# Patient Record
Sex: FEMALE | Race: WHITE | Employment: STUDENT | URBAN - METROPOLITAN AREA
[De-identification: names, ages, dates, MRNs, and addresses within clinical notes are randomized per-mention and may not be internally consistent; named-entity substitution may affect disease eponyms.]

---
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
## 2024-12-06 ENCOUNTER — HOSPITAL ENCOUNTER (OUTPATIENT)
Dept: LAB | Age: 19
Discharge: HOME | End: 2024-12-06
Payer: COMMERCIAL

## 2024-12-06 PROCEDURE — 84478 ASSAY OF TRIGLYCERIDES: CPT

## 2024-12-06 PROCEDURE — 80076 HEPATIC FUNCTION PANEL: CPT

## 2024-12-06 PROCEDURE — 82465 ASSAY BLD/SERUM CHOLESTEROL: CPT

## 2024-12-06 PROCEDURE — 84702 CHORIONIC GONADOTROPIN TEST: CPT

## 2024-12-06 PROCEDURE — 36415 COLL VENOUS BLD VENIPUNCTURE: CPT

## 2024-12-06 PROCEDURE — 85025 COMPLETE CBC W/AUTO DIFF WBC: CPT
